# Patient Record
Sex: FEMALE | NOT HISPANIC OR LATINO
[De-identification: names, ages, dates, MRNs, and addresses within clinical notes are randomized per-mention and may not be internally consistent; named-entity substitution may affect disease eponyms.]

---

## 2017-01-11 ENCOUNTER — LABORATORY RESULT (OUTPATIENT)
Age: 47
End: 2017-01-11

## 2017-01-11 ENCOUNTER — APPOINTMENT (OUTPATIENT)
Dept: INTERNAL MEDICINE | Facility: CLINIC | Age: 47
End: 2017-01-11

## 2017-01-11 VITALS
WEIGHT: 150 LBS | RESPIRATION RATE: 12 BRPM | DIASTOLIC BLOOD PRESSURE: 80 MMHG | HEIGHT: 64 IN | SYSTOLIC BLOOD PRESSURE: 119 MMHG | TEMPERATURE: 98.1 F | BODY MASS INDEX: 25.61 KG/M2 | OXYGEN SATURATION: 99 % | HEART RATE: 62 BPM

## 2017-01-12 LAB
ALBUMIN SERPL ELPH-MCNC: 4.3 G/DL
ALP BLD-CCNC: 58 U/L
ALT SERPL-CCNC: 14 U/L
ANION GAP SERPL CALC-SCNC: 16 MMOL/L
APPEARANCE: ABNORMAL
AST SERPL-CCNC: 20 U/L
BASOPHILS # BLD AUTO: 0.06 K/UL
BASOPHILS NFR BLD AUTO: 0.9 %
BILIRUB SERPL-MCNC: 0.4 MG/DL
BILIRUBIN URINE: NEGATIVE
BLOOD URINE: NEGATIVE
BUN SERPL-MCNC: 14 MG/DL
CALCIUM SERPL-MCNC: 9.4 MG/DL
CHLORIDE SERPL-SCNC: 100 MMOL/L
CHOLEST SERPL-MCNC: 185 MG/DL
CHOLEST/HDLC SERPL: 3 RATIO
CO2 SERPL-SCNC: 22 MMOL/L
COLOR: ABNORMAL
CREAT SERPL-MCNC: 0.68 MG/DL
EOSINOPHIL # BLD AUTO: 0.12 K/UL
EOSINOPHIL NFR BLD AUTO: 1.7 %
GLUCOSE QUALITATIVE U: NORMAL MG/DL
GLUCOSE SERPL-MCNC: 76 MG/DL
HCT VFR BLD CALC: 40.7 %
HDLC SERPL-MCNC: 62 MG/DL
HGB BLD-MCNC: 12.5 G/DL
IMM GRANULOCYTES NFR BLD AUTO: 0.3 %
KETONES URINE: ABNORMAL
LDLC SERPL CALC-MCNC: 101 MG/DL
LEUKOCYTE ESTERASE URINE: NEGATIVE
LYMPHOCYTES # BLD AUTO: 2.33 K/UL
LYMPHOCYTES NFR BLD AUTO: 33.2 %
MAN DIFF?: NORMAL
MCHC RBC-ENTMCNC: 28.7 PG
MCHC RBC-ENTMCNC: 30.7 GM/DL
MCV RBC AUTO: 93.6 FL
MONOCYTES # BLD AUTO: 0.56 K/UL
MONOCYTES NFR BLD AUTO: 8 %
NEUTROPHILS # BLD AUTO: 3.93 K/UL
NEUTROPHILS NFR BLD AUTO: 55.9 %
NITRITE URINE: NEGATIVE
PH URINE: 5.5
PLATELET # BLD AUTO: 271 K/UL
POTASSIUM SERPL-SCNC: 4.1 MMOL/L
PROT SERPL-MCNC: 7.2 G/DL
PROTEIN URINE: NEGATIVE MG/DL
RBC # BLD: 4.35 M/UL
RBC # FLD: 14.2 %
SODIUM SERPL-SCNC: 138 MMOL/L
SPECIFIC GRAVITY URINE: 1.03
TRIGL SERPL-MCNC: 109 MG/DL
TSH SERPL-ACNC: 0.42 UIU/ML
UROBILINOGEN URINE: NORMAL MG/DL
WBC # FLD AUTO: 7.02 K/UL

## 2017-01-13 LAB
25(OH)D3 SERPL-MCNC: 22.1 NG/ML
FERRITIN SERPL-MCNC: 28.6 NG/ML
HBA1C MFR BLD HPLC: 5.7 %
IRON SATN MFR SERPL: 74 %
IRON SERPL-MCNC: 242 UG/DL
TIBC SERPL-MCNC: 326 UG/DL
UIBC SERPL-MCNC: 84 UG/DL
VIT B12 SERPL-MCNC: 332 PG/ML

## 2017-03-01 ENCOUNTER — APPOINTMENT (OUTPATIENT)
Dept: INTERNAL MEDICINE | Facility: CLINIC | Age: 47
End: 2017-03-01

## 2017-06-01 ENCOUNTER — RX RENEWAL (OUTPATIENT)
Age: 47
End: 2017-06-01

## 2017-10-11 ENCOUNTER — APPOINTMENT (OUTPATIENT)
Dept: INTERNAL MEDICINE | Facility: CLINIC | Age: 47
End: 2017-10-11
Payer: MEDICAID

## 2017-10-11 VITALS
BODY MASS INDEX: 25.78 KG/M2 | OXYGEN SATURATION: 98 % | HEART RATE: 69 BPM | HEIGHT: 64 IN | WEIGHT: 151 LBS | TEMPERATURE: 97.9 F | SYSTOLIC BLOOD PRESSURE: 118 MMHG | RESPIRATION RATE: 12 BRPM | DIASTOLIC BLOOD PRESSURE: 71 MMHG

## 2017-10-11 DIAGNOSIS — D64.9 ANEMIA, UNSPECIFIED: ICD-10-CM

## 2017-10-11 DIAGNOSIS — Z00.00 ENCOUNTER FOR GENERAL ADULT MEDICAL EXAMINATION W/OUT ABNORMAL FINDINGS: ICD-10-CM

## 2017-10-11 PROCEDURE — 90471 IMMUNIZATION ADMIN: CPT

## 2017-10-11 PROCEDURE — 90686 IIV4 VACC NO PRSV 0.5 ML IM: CPT

## 2017-10-11 PROCEDURE — 99214 OFFICE O/P EST MOD 30 MIN: CPT | Mod: 25

## 2017-10-13 LAB
ANION GAP SERPL CALC-SCNC: 16 MMOL/L
BUN SERPL-MCNC: 18 MG/DL
CALCIUM SERPL-MCNC: 9.6 MG/DL
CHLORIDE SERPL-SCNC: 104 MMOL/L
CO2 SERPL-SCNC: 22 MMOL/L
CREAT SERPL-MCNC: 0.77 MG/DL
FERRITIN SERPL-MCNC: 17 NG/ML
GLUCOSE SERPL-MCNC: 74 MG/DL
HBA1C MFR BLD HPLC: 5.6 %
POTASSIUM SERPL-SCNC: 4.6 MMOL/L
SODIUM SERPL-SCNC: 142 MMOL/L

## 2017-10-15 LAB
IRON SATN MFR SERPL: 5 %
IRON SERPL-MCNC: 16 UG/DL
TIBC SERPL-MCNC: 336 UG/DL
UIBC SERPL-MCNC: 320 UG/DL

## 2017-10-16 LAB
BASOPHILS # BLD AUTO: 0.08 K/UL
BASOPHILS NFR BLD AUTO: 1.3 %
EOSINOPHIL # BLD AUTO: 0.12 K/UL
EOSINOPHIL NFR BLD AUTO: 1.9 %
HCT VFR BLD CALC: 33.8 %
HGB BLD-MCNC: 10.2 G/DL
IMM GRANULOCYTES NFR BLD AUTO: 0.3 %
LYMPHOCYTES # BLD AUTO: 2.56 K/UL
LYMPHOCYTES NFR BLD AUTO: 40.6 %
MAN DIFF?: NORMAL
MCHC RBC-ENTMCNC: 26.2 PG
MCHC RBC-ENTMCNC: 30.2 GM/DL
MCV RBC AUTO: 86.7 FL
MONOCYTES # BLD AUTO: 0.53 K/UL
MONOCYTES NFR BLD AUTO: 8.4 %
NEUTROPHILS # BLD AUTO: 2.99 K/UL
NEUTROPHILS NFR BLD AUTO: 47.5 %
PLATELET # BLD AUTO: 296 K/UL
RBC # BLD: 3.9 M/UL
RBC # FLD: 17.2 %
WBC # FLD AUTO: 6.3 K/UL

## 2017-11-30 ENCOUNTER — APPOINTMENT (OUTPATIENT)
Dept: NEUROLOGY | Facility: CLINIC | Age: 47
End: 2017-11-30
Payer: MEDICAID

## 2017-11-30 VITALS
SYSTOLIC BLOOD PRESSURE: 126 MMHG | HEIGHT: 64 IN | BODY MASS INDEX: 25.95 KG/M2 | HEART RATE: 56 BPM | DIASTOLIC BLOOD PRESSURE: 78 MMHG | WEIGHT: 152 LBS

## 2017-11-30 DIAGNOSIS — J32.9 CHRONIC SINUSITIS, UNSPECIFIED: ICD-10-CM

## 2017-11-30 PROCEDURE — 99205 OFFICE O/P NEW HI 60 MIN: CPT

## 2020-03-30 ENCOUNTER — APPOINTMENT (OUTPATIENT)
Dept: INTERNAL MEDICINE | Facility: CLINIC | Age: 50
End: 2020-03-30

## 2022-06-30 ENCOUNTER — APPOINTMENT (OUTPATIENT)
Dept: OBGYN | Facility: CLINIC | Age: 52
End: 2022-06-30

## 2022-08-15 ENCOUNTER — APPOINTMENT (OUTPATIENT)
Dept: RADIATION ONCOLOGY | Facility: CLINIC | Age: 52
End: 2022-08-15

## 2022-08-15 VITALS
DIASTOLIC BLOOD PRESSURE: 70 MMHG | RESPIRATION RATE: 19 BRPM | BODY MASS INDEX: 27.31 KG/M2 | WEIGHT: 160 LBS | HEIGHT: 64 IN | SYSTOLIC BLOOD PRESSURE: 118 MMHG | HEART RATE: 75 BPM | OXYGEN SATURATION: 99 %

## 2022-08-15 DIAGNOSIS — D05.11 INTRADUCTAL CARCINOMA IN SITU OF RIGHT BREAST: ICD-10-CM

## 2022-08-15 DIAGNOSIS — F17.200 NICOTINE DEPENDENCE, UNSPECIFIED, UNCOMPLICATED: ICD-10-CM

## 2022-08-15 DIAGNOSIS — Z78.9 OTHER SPECIFIED HEALTH STATUS: ICD-10-CM

## 2022-08-15 PROCEDURE — 99204 OFFICE O/P NEW MOD 45 MIN: CPT | Mod: 25

## 2022-08-15 PROCEDURE — 77263 THER RADIOLOGY TX PLNG CPLX: CPT

## 2022-08-15 RX ORDER — VALACYCLOVIR 1 G/1
1 TABLET, FILM COATED ORAL
Qty: 30 | Refills: 0 | Status: ACTIVE | COMMUNITY
Start: 2022-06-28

## 2022-08-15 RX ORDER — ERGOCALCIFEROL 1.25 MG/1
1.25 MG CAPSULE, LIQUID FILLED ORAL
Qty: 4 | Refills: 0 | Status: ACTIVE | COMMUNITY
Start: 2022-07-31

## 2022-08-15 RX ORDER — NICOTINE POLACRILEX 2 MG/1
2 GUM, CHEWING ORAL
Qty: 1 | Refills: 3 | Status: DISCONTINUED | COMMUNITY
Start: 2017-01-11 | End: 2022-08-15

## 2022-08-15 NOTE — HISTORY OF PRESENT ILLNESS
[FreeTextEntry1] : The patient is a pleasant 51 year old female diagnosed with right breast DCIS. She moved to [place] from [place] in March 2022 and went for screening mammogram 6/8/22.  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 1.5 cm linear non-mass area of enhancement in the medial right breast, middle to posterior depth, 3:00 axis. 2 adjacent small enhancing masses in the medial left breast, 9:00 axis, middle to posterior depth with concordant nodular asymmetry seen on mammography. A few nonspecific B/L axillary LN seen. \par [employer] Genetic panel test was negative 7/8/22.\par Diagnostic sonogram B/L breast on 7/11/22 showed two adjacent hypoechoic nodules in left breast 9:30, N4 corresponding to MRI finding.\par \par Left breast biopsy 7/13/22 showed benign tissue with fibroadenomatoid and fibrocystic changes.\par \par MRI guided biopsy of 2-3 :00 axis right breast enhancement was benign.\par \par She underwent right breast excision on 7/26/22. Pathology demonstrated DCIS 1 x 1 x 1 mm, intermediate grade, necrosis present. All margins negative, closest margin 2.5 mm anterior and posterior 5  SLN negative for tumor cells. ER % positive, KS 21-30% positive.\par \par She is healing well and presents to discuss the role of radiation therapy in her care. \par

## 2022-08-15 NOTE — PHYSICAL EXAM
[No UE Edema] : there is no upper extremity edema [Symmetric] : breasts are symmetric [Normal] : oriented to person, place and time, the affect was normal, the mood was normal and not anxious [de-identified] : well healed scar R UOQ with mild firmness UOQ

## 2022-08-15 NOTE — VITALS
[Maximal Pain Intensity: 0/10] : 0/10 [NoTreatment Scheduled] : no treatment scheduled [90: Able to carry normal activity; minor signs or symptoms of disease.] : 90: Able to carry normal activity; minor signs or symptoms of disease.  [Date: ____________] : Patient's last distress assessment performed on [unfilled]. [4 - Distress Level] : Distress Level: 4 [Patient given social work contact information and resource sheet] : Patient was given social work contact information and resource sheet

## 2022-08-22 ENCOUNTER — FORM ENCOUNTER (OUTPATIENT)
Age: 52
End: 2022-08-22

## 2022-08-30 PROCEDURE — 77333 RADIATION TREATMENT AID(S): CPT

## 2022-08-30 PROCEDURE — 77290 THER RAD SIMULAJ FIELD CPLX: CPT

## 2022-09-07 PROCEDURE — 77334 RADIATION TREATMENT AID(S): CPT

## 2022-09-07 PROCEDURE — 77295 3-D RADIOTHERAPY PLAN: CPT

## 2022-09-07 PROCEDURE — 77300 RADIATION THERAPY DOSE PLAN: CPT

## 2022-09-09 PROCEDURE — 77280 THER RAD SIMULAJ FIELD SMPL: CPT

## 2022-09-12 PROCEDURE — G6012: CPT

## 2022-09-13 ENCOUNTER — NON-APPOINTMENT (OUTPATIENT)
Age: 52
End: 2022-09-13

## 2022-09-13 VITALS
BODY MASS INDEX: 27.49 KG/M2 | HEIGHT: 64 IN | WEIGHT: 161 LBS | HEART RATE: 85 BPM | RESPIRATION RATE: 18 BRPM | OXYGEN SATURATION: 98 %

## 2022-09-13 PROCEDURE — G6012: CPT

## 2022-09-13 NOTE — DISEASE MANAGEMENT
[Pathological] : TNM Stage: p [0] : 0 [TTNM] : is [NTNM] : 0 [MTNM] : 0 [de-identified] : 748 [[place] [id number]] : 5898 [de-identified] : right breast

## 2022-09-13 NOTE — HISTORY OF PRESENT ILLNESS
[FreeTextEntry1] : The patient is a pleasant 51 year old female diagnosed with right breast DCIS. She moved to [place] from [place] in March 2022 and went for screening mammogram 6/8/22.  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 1.5 cm linear non-mass area of enhancement in the medial right breast, middle to posterior depth, 3:00 axis. 2 adjacent small enhancing masses in the medial left breast, 9:00 axis, middle to posterior depth with concordant nodular asymmetry seen on mammography. A few nonspecific B/L axillary LN seen. \par [employer] Genetic panel test was negative 7/8/22.\par Diagnostic sonogram B/L breast on 7/11/22 showed two adjacent hypoechoic nodules in left breast 9:30, N4 corresponding to MRI finding.\par \par Left breast biopsy 7/13/22 showed benign tissue with fibroadenomatoid and fibrocystic changes.\par \par MRI guided biopsy of 2-3 :00 axis right breast enhancement was benign.\par \par She underwent right breast excision on 7/26/22. Pathology demonstrated DCIS 1 x 1 x 1 mm, intermediate grade, necrosis present. All margins negative, closest margin 2.5 mm anterior and posterior 5  SLN negative for tumor cells. ER % positive, MT 21-30% positive.\par \par She is healing well and presents to discuss the role of radiation therapy in her care. \par \par She is seen for OTV #2/20. She feels well and is using aloe on her right breast. Trying reflexology today with our holistic nurse.\par

## 2022-09-13 NOTE — PHYSICAL EXAM
[Normal] : well developed, well nourished, in no acute distress [de-identified] : G0 erythema R breast

## 2022-09-14 PROCEDURE — G6012: CPT

## 2022-09-15 PROCEDURE — G6012: CPT

## 2022-09-16 PROCEDURE — G6012: CPT

## 2022-09-16 PROCEDURE — 77427 RADIATION TX MANAGEMENT X5: CPT

## 2022-09-16 PROCEDURE — 77417 THER RADIOLOGY PORT IMAGE(S): CPT

## 2022-09-16 PROCEDURE — 77336 RADIATION PHYSICS CONSULT: CPT

## 2022-09-19 PROCEDURE — G6012: CPT

## 2022-09-20 ENCOUNTER — NON-APPOINTMENT (OUTPATIENT)
Age: 52
End: 2022-09-20

## 2022-09-20 VITALS
WEIGHT: 163 LBS | RESPIRATION RATE: 18 BRPM | OXYGEN SATURATION: 98 % | HEART RATE: 80 BPM | BODY MASS INDEX: 27.83 KG/M2 | HEIGHT: 64 IN

## 2022-09-20 PROCEDURE — G6012: CPT

## 2022-09-20 NOTE — HISTORY OF PRESENT ILLNESS
[FreeTextEntry1] : The patient is a pleasant 51 year old female diagnosed with right breast DCIS. She moved to [place] from [place] in March 2022 and went for screening mammogram 6/8/22.  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 1.5 cm linear non-mass area of enhancement in the medial right breast, middle to posterior depth, 3:00 axis. 2 adjacent small enhancing masses in the medial left breast, 9:00 axis, middle to posterior depth with concordant nodular asymmetry seen on mammography. A few nonspecific B/L axillary LN seen. \par [employer] Genetic panel test was negative 7/8/22.\par Diagnostic sonogram B/L breast on 7/11/22 showed two adjacent hypoechoic nodules in left breast 9:30, N4 corresponding to MRI finding.\par \par Left breast biopsy 7/13/22 showed benign tissue with fibroadenomatoid and fibrocystic changes.\par \par MRI guided biopsy of 2-3 :00 axis right breast enhancement was benign.\par \par She underwent right breast excision on 7/26/22. Pathology demonstrated DCIS 1 x 1 x 1 mm, intermediate grade, necrosis present. All margins negative, closest margin 2.5 mm anterior and posterior 5  SLN negative for tumor cells. ER % positive, ND 21-30% positive.\par \par She is healing well and presents to discuss the role of radiation therapy in her care. \par \par She is seen for OTV # 7/20. She feels well and is using aloe on her right breast. Participating in reflexology today with our holistic nurse if possible. Reports thinning hair. Has not started endocrine therapy.\par

## 2022-09-20 NOTE — DISEASE MANAGEMENT
[Pathological] : TNM Stage: p [0] : 0 [TTNM] : is [NTNM] : 0 [MTNM] : 0 [de-identified] : 0295 [[place] [id number]] : 1521 [de-identified] : right breast

## 2022-09-20 NOTE — PHYSICAL EXAM
[Normal] : well developed, well nourished, in no acute distress [de-identified] : faint hyperpigmentation R breast

## 2022-09-21 PROCEDURE — G6012: CPT

## 2022-09-22 PROCEDURE — G6012: CPT

## 2022-09-23 PROCEDURE — 77336 RADIATION PHYSICS CONSULT: CPT

## 2022-09-23 PROCEDURE — 77417 THER RADIOLOGY PORT IMAGE(S): CPT

## 2022-09-23 PROCEDURE — 77427 RADIATION TX MANAGEMENT X5: CPT

## 2022-09-23 PROCEDURE — G6012: CPT

## 2022-09-26 PROCEDURE — G6012: CPT

## 2022-09-27 ENCOUNTER — NON-APPOINTMENT (OUTPATIENT)
Age: 52
End: 2022-09-27

## 2022-09-27 VITALS
OXYGEN SATURATION: 97 % | RESPIRATION RATE: 21 BRPM | WEIGHT: 163 LBS | HEART RATE: 88 BPM | HEIGHT: 64 IN | BODY MASS INDEX: 27.83 KG/M2

## 2022-09-27 PROCEDURE — G6012: CPT

## 2022-09-27 NOTE — DISEASE MANAGEMENT
[Pathological] : TNM Stage: p [0] : 0 [TTNM] : is [NTNM] : 0 [MTNM] : 0 [de-identified] : 6004 [[place] [id number]] : 3635 [de-identified] : right breast

## 2022-09-27 NOTE — PHYSICAL EXAM
[Normal] : well developed, well nourished, in no acute distress [No UE Edema] : there is no upper extremity edema [de-identified] : hyperpigmentation R breast, no desquamation

## 2022-09-27 NOTE — HISTORY OF PRESENT ILLNESS
[FreeTextEntry1] : The patient is a pleasant 52 year old female diagnosed with right breast DCIS. She moved to [place] from [place] in March 2022 and went for screening mammogram 6/8/22.  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 1.5 cm linear non-mass area of enhancement in the medial right breast, middle to posterior depth, 3:00 axis. 2 adjacent small enhancing masses in the medial left breast, 9:00 axis, middle to posterior depth with concordant nodular asymmetry seen on mammography. A few nonspecific B/L axillary LN seen. \par [employer] Genetic panel test was negative 7/8/22.\par Diagnostic sonogram B/L breast on 7/11/22 showed two adjacent hypoechoic nodules in left breast 9:30, N4 corresponding to MRI finding.\par \par Left breast biopsy 7/13/22 showed benign tissue with fibroadenomatoid and fibrocystic changes.\par \par MRI guided biopsy of 2-3 :00 axis right breast enhancement was benign.\par \par She underwent right breast excision on 7/26/22. Pathology demonstrated DCIS 1 x 1 x 1 mm, intermediate grade, necrosis present. All margins negative, closest margin 2.5 mm anterior and posterior 5  SLN negative for tumor cells. ER % positive, NE 21-30% positive.\par \par She is healing well and presents to discuss the role of radiation therapy in her care. \par \par She is seen for OTV # 12/20. She feels well and is using aloe on her right breast. Has met with our holistic nurse for reflexology but too busy today. Reports thinning hair. Has not started endocrine therapy. Increasing hyperpigmentation R breast no desquamation.\par

## 2022-09-28 PROCEDURE — G6012: CPT

## 2022-09-29 PROCEDURE — G6012: CPT

## 2022-09-30 PROCEDURE — G6012: CPT

## 2022-09-30 PROCEDURE — 77427 RADIATION TX MANAGEMENT X5: CPT

## 2022-09-30 PROCEDURE — 77417 THER RADIOLOGY PORT IMAGE(S): CPT

## 2022-09-30 PROCEDURE — 77336 RADIATION PHYSICS CONSULT: CPT

## 2022-10-03 PROCEDURE — G6012: CPT

## 2022-10-04 ENCOUNTER — NON-APPOINTMENT (OUTPATIENT)
Age: 52
End: 2022-10-04

## 2022-10-04 VITALS
BODY MASS INDEX: 27.83 KG/M2 | HEART RATE: 73 BPM | WEIGHT: 163 LBS | RESPIRATION RATE: 18 BRPM | OXYGEN SATURATION: 98 % | HEIGHT: 64 IN

## 2022-10-04 PROCEDURE — G6012: CPT

## 2022-10-04 RX ORDER — FLUTICASONE PROPIONATE 0.05 MG/G
0.01 OINTMENT TOPICAL TWICE DAILY
Qty: 60 | Refills: 1 | Status: ACTIVE | COMMUNITY
Start: 2022-10-04 | End: 1900-01-01

## 2022-10-04 NOTE — PHYSICAL EXAM
[Normal] : well developed, well nourished, in no acute distress [de-identified] : brisk erythema R breast without desquamation

## 2022-10-04 NOTE — DISEASE MANAGEMENT
[Pathological] : TNM Stage: p [0] : 0 [TTNM] : is [NTNM] : 0 [MTNM] : 0 [de-identified] : 3390 [de-identified] : 7216 [de-identified] : right breast

## 2022-10-04 NOTE — HISTORY OF PRESENT ILLNESS
[FreeTextEntry1] : The patient is a pleasant 52 year old female diagnosed with right breast DCIS. She moved to [place] from [place] in March 2022 and went for screening mammogram 6/8/22.  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 1.5 cm linear non-mass area of enhancement in the medial right breast, middle to posterior depth, 3:00 axis. 2 adjacent small enhancing masses in the medial left breast, 9:00 axis, middle to posterior depth with concordant nodular asymmetry seen on mammography. A few nonspecific B/L axillary LN seen. \par [employer] Genetic panel test was negative 7/8/22.\par Diagnostic sonogram B/L breast on 7/11/22 showed two adjacent hypoechoic nodules in left breast 9:30, N4 corresponding to MRI finding.\par \par Left breast biopsy 7/13/22 showed benign tissue with fibroadenomatoid and fibrocystic changes.\par \par MRI guided biopsy of 2-3 :00 axis right breast enhancement was benign.\par \par She underwent right breast excision on 7/26/22. Pathology demonstrated DCIS 1 x 1 x 1 mm, intermediate grade, necrosis present. All margins negative, closest margin 2.5 mm anterior and posterior 5  SLN negative for tumor cells. ER % positive, AL 21-30% positive.\par \par She is healing well and presents to discuss the role of radiation therapy in her care. \par \par She is seen for OTV # 17/20. She feels well and is using aloe on her right breast. Has met with our holistic nurse for reflexology. Reports thinning hair. Has not started endocrine therapy. Increasing hyperpigmentation R breast no desquamation. Reports intense pruritus in the mid chest with some rash. using aloe only. Going to [place] to visit her mother on 10/13/22.\par

## 2022-10-05 PROCEDURE — G6012: CPT

## 2022-10-06 PROCEDURE — G6012: CPT

## 2022-10-07 PROCEDURE — 77336 RADIATION PHYSICS CONSULT: CPT

## 2022-10-07 PROCEDURE — G6012: CPT

## 2022-10-07 PROCEDURE — 77427 RADIATION TX MANAGEMENT X5: CPT

## 2022-11-09 NOTE — HISTORY OF PRESENT ILLNESS
[FreeTextEntry1] : The patient is a pleasant 52 year old female diagnosed with right breast DCIS. She moved to [place] from [place] in March 2022 and went for screening mammogram 6/8/22.  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 1.5 cm linear non-mass area of enhancement in the medial right breast, middle to posterior depth, 3:00 axis. 2 adjacent small enhancing masses in the medial left breast, 9:00 axis, middle to posterior depth with concordant nodular asymmetry seen on mammography. A few nonspecific B/L axillary LN seen. \par [employer] Genetic panel test was negative 7/8/22.\par Diagnostic sonogram B/L breast on 7/11/22 showed two adjacent hypoechoic nodules in left breast 9:30, N4 corresponding to MRI finding.\par \par Left breast biopsy 7/13/22 showed benign tissue with fibroadenomatoid and fibrocystic changes.\par \par MRI guided biopsy of 2-3 :00 axis right breast enhancement was benign.\par \par She underwent right breast excision on 7/26/22. Pathology demonstrated DCIS 1 x 1 x 1 mm, intermediate grade, necrosis present. All margins negative, closest margin 2.5 mm anterior and posterior 5  SLN negative for tumor cells. ER % positive, SC 21-30% positive.\par \par She is healing well and presents to discuss the role of radiation therapy in her care. \par \par She completed a dose of 5240 cGy to the right breast on 10/7/22. \par \par She presents for a one month PTE. She feels well and is using aloe on her right breast. Has met with our holistic nurse for reflexology. Reports thinning hair. Has not started endocrine therapy. Increasing hyperpigmentation R breast no desquamation. Reports intense pruritus in the mid chest with some rash. using aloe only. Going to [place] to visit her mother on 10/13/22.\par

## 2022-11-16 ENCOUNTER — APPOINTMENT (OUTPATIENT)
Dept: RADIATION ONCOLOGY | Facility: CLINIC | Age: 52
End: 2022-11-16

## 2025-05-19 ENCOUNTER — APPOINTMENT (OUTPATIENT)
Dept: GASTROENTEROLOGY | Facility: CLINIC | Age: 55
End: 2025-05-19